# Patient Record
Sex: FEMALE | Race: WHITE | NOT HISPANIC OR LATINO
[De-identification: names, ages, dates, MRNs, and addresses within clinical notes are randomized per-mention and may not be internally consistent; named-entity substitution may affect disease eponyms.]

---

## 2021-12-03 ENCOUNTER — DASHBOARD ENCOUNTERS (OUTPATIENT)
Age: 41
End: 2021-12-03

## 2021-12-03 ENCOUNTER — LAB OUTSIDE AN ENCOUNTER (OUTPATIENT)
Dept: URBAN - NONMETROPOLITAN AREA CLINIC 4 | Facility: CLINIC | Age: 41
End: 2021-12-03

## 2021-12-03 ENCOUNTER — OFFICE VISIT (OUTPATIENT)
Dept: URBAN - NONMETROPOLITAN AREA CLINIC 4 | Facility: CLINIC | Age: 41
End: 2021-12-03
Payer: COMMERCIAL

## 2021-12-03 VITALS
TEMPERATURE: 98 F | WEIGHT: 136.6 LBS | HEART RATE: 74 BPM | DIASTOLIC BLOOD PRESSURE: 75 MMHG | SYSTOLIC BLOOD PRESSURE: 113 MMHG | HEIGHT: 62 IN | BODY MASS INDEX: 25.14 KG/M2

## 2021-12-03 DIAGNOSIS — R10.11 RUQ PAIN: ICD-10-CM

## 2021-12-03 DIAGNOSIS — Z80.0 FAMILY HISTORY OF COLON CANCER IN FATHER: ICD-10-CM

## 2021-12-03 DIAGNOSIS — R63.4 WEIGHT LOSS: ICD-10-CM

## 2021-12-03 DIAGNOSIS — K58.0 IRRITABLE BOWEL SYNDROME WITH DIARRHEA: ICD-10-CM

## 2021-12-03 DIAGNOSIS — R11.2 NAUSEA AND VOMITING, UNSPECIFIED VOMITING TYPE: ICD-10-CM

## 2021-12-03 DIAGNOSIS — Z87.19 HISTORY OF CHOLELITHIASIS: ICD-10-CM

## 2021-12-03 PROCEDURE — 99204 OFFICE O/P NEW MOD 45 MIN: CPT | Performed by: REGISTERED NURSE

## 2021-12-03 RX ORDER — SODIUM, POTASSIUM,MAG SULFATES 17.5-3.13G
177 ML SOLUTION, RECONSTITUTED, ORAL ORAL
Qty: 1 BOTTLE | Refills: 0 | OUTPATIENT
Start: 2021-12-03 | End: 2021-12-04

## 2021-12-03 RX ORDER — L.ACID,FERM,PLA,RHA/B.BIF,LONG 126 MG
AS DIRECTED TABLET, DELAYED AND EXTENDED RELEASE ORAL
Status: ACTIVE | COMMUNITY

## 2021-12-03 NOTE — PHYSICAL EXAM GASTROINTESTINAL
Abdomen , soft, TTP in RUQ and across lower abdomen, nondistended , no guarding or rigidity , no masses palpable , normal bowel sounds , Liver and Spleen , no hepatomegaly present , no hepatosplenomegaly , liver nontender , spleen not palpable

## 2021-12-03 NOTE — HPI-TODAY'S VISIT:
12/3/21: Pt is a 42 yo female with PMH of IBS, migraines, Hashimoto's who was referred by JORGE L Humphreys, for nausea, vomiting and abdominal pain.   Pt reports symptoms since September. Pain is RUQ and radiates into her back. Pain worse with palpation and eating fatty/greasy foods. Has associated bloating, nausea and vomiting and bitter taste in mouth. Stools are fatty and greasy. She has a Hx of gallstones. Reports postprandial diarrhea. Has anywhere 1-4 BMs daily. She has tried digestive enzymes with no improvement. Deneis any hematochezia or melena. She has lost 15 lbs over past 2 months. Father with Hx of CRC (Diagnosed at age 59) and polyps. Last EGD/cscope 3/24/16 by Dr. Janis Aguero. Colonoscopy was unremarkable. EGD showed HPY gastritis.

## 2021-12-05 LAB
A/G RATIO: 1.8
ALBUMIN: 4.6
ALKALINE PHOSPHATASE: 55
ALT (SGPT): 20
AST (SGOT): 20
BILIRUBIN, TOTAL: 0.4
BUN/CREATININE RATIO: 11
BUN: 8
CALCIUM: 9.7
CARBON DIOXIDE, TOTAL: 24
CHLORIDE: 103
CREATININE: 0.76
DEAMIDATED GLIADIN ABS, IGA: 4
DEAMIDATED GLIADIN ABS, IGG: 7
EGFR IF AFRICN AM: 113
EGFR IF NONAFRICN AM: 98
ENDOMYSIAL ANTIBODY IGA: NEGATIVE
GLOBULIN, TOTAL: 2.5
GLUCOSE: 89
HEMATOCRIT: 42.4
HEMOGLOBIN: 13.9
IMMUNOGLOBULIN A, QN, SERUM: 325
LIPASE: 33
MCH: 28.7
MCHC: 32.8
MCV: 88
NRBC: (no result)
PLATELETS: 324
POTASSIUM: 4.7
PROTEIN, TOTAL: 7.1
RBC: 4.84
RDW: 12.6
SODIUM: 142
T-TRANSGLUTAMINASE (TTG) IGA: <2
T-TRANSGLUTAMINASE (TTG) IGG: 2
WBC: 4.4

## 2021-12-06 ENCOUNTER — LAB OUTSIDE AN ENCOUNTER (OUTPATIENT)
Dept: URBAN - METROPOLITAN AREA CLINIC 54 | Facility: CLINIC | Age: 41
End: 2021-12-06

## 2021-12-13 PROBLEM — 197125005: Status: ACTIVE | Noted: 2021-12-03

## 2021-12-21 ENCOUNTER — OFFICE VISIT (OUTPATIENT)
Dept: URBAN - NONMETROPOLITAN AREA CLINIC 4 | Facility: CLINIC | Age: 41
End: 2021-12-21

## 2021-12-22 ENCOUNTER — OFFICE VISIT (OUTPATIENT)
Dept: URBAN - METROPOLITAN AREA SURGERY CENTER 14 | Facility: SURGERY CENTER | Age: 41
End: 2021-12-22
Payer: COMMERCIAL

## 2021-12-22 DIAGNOSIS — R19.4 ALTERATION IN BOWEL ELIMINATION: ICD-10-CM

## 2021-12-22 DIAGNOSIS — K63.89 BACTERIAL OVERGROWTH SYNDROME: ICD-10-CM

## 2021-12-22 DIAGNOSIS — R10.84 ABDOMINAL CRAMPING, GENERALIZED: ICD-10-CM

## 2021-12-22 PROCEDURE — G8907 PT DOC NO EVENTS ON DISCHARG: HCPCS | Performed by: INTERNAL MEDICINE

## 2021-12-22 PROCEDURE — 45380 COLONOSCOPY AND BIOPSY: CPT | Performed by: INTERNAL MEDICINE

## 2021-12-22 RX ORDER — L.ACID,FERM,PLA,RHA/B.BIF,LONG 126 MG
AS DIRECTED TABLET, DELAYED AND EXTENDED RELEASE ORAL
Status: ACTIVE | COMMUNITY

## 2022-01-05 ENCOUNTER — OFFICE VISIT (OUTPATIENT)
Dept: URBAN - NONMETROPOLITAN AREA CLINIC 4 | Facility: CLINIC | Age: 42
End: 2022-01-05

## 2022-02-24 ENCOUNTER — OFFICE VISIT (OUTPATIENT)
Dept: URBAN - NONMETROPOLITAN AREA CLINIC 4 | Facility: CLINIC | Age: 42
End: 2022-02-24